# Patient Record
Sex: MALE | Race: OTHER | HISPANIC OR LATINO | URBAN - METROPOLITAN AREA
[De-identification: names, ages, dates, MRNs, and addresses within clinical notes are randomized per-mention and may not be internally consistent; named-entity substitution may affect disease eponyms.]

---

## 2023-08-13 ENCOUNTER — EMERGENCY (EMERGENCY)
Facility: HOSPITAL | Age: 43
LOS: 1 days | Discharge: HOME | End: 2023-08-13
Attending: EMERGENCY MEDICINE
Payer: OTHER GOVERNMENT

## 2023-08-13 VITALS
RESPIRATION RATE: 18 BRPM | HEART RATE: 91 BPM | TEMPERATURE: 98 F | SYSTOLIC BLOOD PRESSURE: 122 MMHG | DIASTOLIC BLOOD PRESSURE: 73 MMHG | WEIGHT: 218.92 LBS | OXYGEN SATURATION: 97 %

## 2023-08-13 VITALS
DIASTOLIC BLOOD PRESSURE: 63 MMHG | OXYGEN SATURATION: 99 % | HEART RATE: 71 BPM | SYSTOLIC BLOOD PRESSURE: 140 MMHG | RESPIRATION RATE: 16 BRPM

## 2023-08-13 PROCEDURE — 93010 ELECTROCARDIOGRAM REPORT: CPT

## 2023-08-13 PROCEDURE — 99283 EMERGENCY DEPT VISIT LOW MDM: CPT | Mod: 25

## 2023-08-13 PROCEDURE — 93005 ELECTROCARDIOGRAM TRACING: CPT

## 2023-08-13 PROCEDURE — 99284 EMERGENCY DEPT VISIT MOD MDM: CPT

## 2023-08-13 RX ORDER — ALPRAZOLAM 0.25 MG
0.5 TABLET ORAL ONCE
Refills: 0 | Status: DISCONTINUED | OUTPATIENT
Start: 2023-08-13 | End: 2023-08-13

## 2023-08-13 RX ADMIN — Medication 0.5 MILLIGRAM(S): at 21:21

## 2023-08-13 NOTE — ED PROVIDER NOTE - CLINICAL SUMMARY MEDICAL DECISION MAKING FREE TEXT BOX
Patient is a 41 yo male with PMHx anxiety on sertraline presenting after panic attack. Patient states 2 hours PTA he had an episode of anxiety, dull chest tightness, and SOB which lasted for 5 minutes. VSS, denies SI/HI/hallucinations.      EKG WNl, patient given xanax for anxiety attack, clinically sober, ambulating on his own, tolerating PO, well appearing, ready for DC with psych f/u.

## 2023-08-13 NOTE — ED PROVIDER NOTE - CARE PROVIDER_API CALL
Chencho Triana  Psychiatry  [address] [place] [id number]  Phone: [phone]  Fax: [phone]  Follow Up Time: 1-3 Days

## 2023-08-13 NOTE — ED ADULT NURSE NOTE - OBJECTIVE STATEMENT
pt presents to ed with anxiety.  pt reports going through a lot and cant seem to calm down.  NAD at this time.

## 2023-08-13 NOTE — ED PROVIDER NOTE - PATIENT PORTAL LINK FT
You can access the FollowMyHealth Patient Portal offered by [facility] by registering at the following website: [email or web address]. By joining [employer]’s FollowMyHealth portal, you will also be able to view your health information using other applications (apps) compatible with our system.

## 2023-08-13 NOTE — ED PROVIDER NOTE - OBJECTIVE STATEMENT
Patient is a 43 yo male with PMHx anxiety on sertraline presenting after panic attack. Patient states 2 hours PTA he had an episode of anxiety, dull chest tightness, and SOB which lasted for 5 minutes. Denies LOC, syncope,  blood thinners, diaphoresis, n/v. Denies any other sx. Denies any PMHx, allergies, smoking, IVDU, FH cardiac dx. Currently asymptomatic, anxious appearing however calm and cooperative. Denies fevers, chills, dizziness, lightheadedness, dysphagia, dysarthria, diplopia, photophobia, syncope, cough, congestion, abdominal pain, neck pain, back pain, diarrhea, dysuria, hematuria, hematochezia, hematemesis, n/v, recent travel, sick contacts, leg swelling.

## 2023-08-13 NOTE — ED PROVIDER NOTE - PHYSICAL EXAMINATION
Constitutional: Anxious appearing, awake, alert, oriented to person, place, and time/situation and in no apparent distress  ENMT: Airway patent nasal mucosa clear. Mouth with normal mucosa. Throat has no vesicles no oropharyngeal exudates and uvula is midline. No blood in the oropharynx  EYES: clear bilaterally, pupils equal, round and reactive to light  Cardiac: Regular rate, regular rhythm. Heart sounds S1, S2. No murmurs, rubs or gallops. Good capillary refill, 2+ pulses, no peripheral edema  Respiratory: Lungs CTAB, no use of accessory muscles, no crackles, satting 99% on RA in no distress  Gastrointestinal: Abdomen nondistended, non-tender, no rebound guarding or peritoneal signs  Genitourinary: No CVA tenderness, pelvis nontender, bladder nondistended  Musculoskeletal: Spine appears normal, range of motion is not limited, no muscle or joint tenderness  Neurological: Alert and oriented, no focal deficits, no motor or sensory deficits. CN 2-12 intact, PERRLA, EOMI, No FND

## 2023-08-13 NOTE — ED ADULT TRIAGE NOTE - CHIEF COMPLAINT QUOTE
pt states he is having an anxiety attack, going through a lot, in the , c/o feeling overwhelmed want something to calm me down  A&Ox3, resp wnl

## 2023-08-13 NOTE — ED PROVIDER NOTE - NSFOLLOWUPINSTRUCTIONS_ED_ALL_ED_FT
Panic Attack  A panic attack is a sudden episode of severe anxiety, fear, or discomfort that causes physical and emotional symptoms. A panic attack may be in response to something frightening, or it may occur for no known reason.    Symptoms of a panic attack can be similar to symptoms of a heart attack or stroke. It is important to see your health care provider when you have a panic attack so that these conditions can be ruled out.    What are the causes?  A panic attack may be caused by:  An extreme, life-threatening situation, such as a war or natural disaster.  An anxiety disorder, such as post-traumatic stress disorder.  Depression.  Panic disorder.  Certain medical conditions, including heart problems, neurological conditions, and infections.  Other causes may include:  Certain over-the-counter and prescription medicines.  Supplements that increase anxiety.  Illegal drugs that increase heart rate and blood pressure, such as methamphetamine.  What increases the risk?  You are more likely to develop this condition if:  You have another mental health condition.  You use alcohol, illegal drugs, or other substances.  You are under extreme stress.  A life event is causing increased feelings of anxiety and depression.  What are the signs or symptoms?  A panic attack starts suddenly, usually lasts 5–10 minutes, and occurs with one or more of the following:  A pounding heart, or a feeling that your heart is beating irregularly or faster than normal (palpitations).  Sweating, trembling, or shaking.  Shortness of breath, feeling smothered, or feeling choked.  Chest pain or discomfort.  Nausea or a strange feeling in your stomach.  Dizziness, feeling light-headed, or feeling like you might faint.  Other symptoms may include:  Chills or hot flashes.  Numbness or tingling in your lips, hands, or feet.  Feeling confused, or feeling that you are not yourself.  Fear of losing control or of being emotionally unstable, or fear of dying.  How is this diagnosed?  Doctor speaking with a patient in the doctor's office.  A panic attack is diagnosed with an assessment by your health care provider. During the assessment, your health care provider will ask questions about:  Your history of anxiety, depression, and panic attacks.  Your medical history.  Whether you drink alcohol, use drugs, take supplements, or take medicines. Be honest about your substance use.  Your health care provider may also:  Order blood tests or other kinds of tests to rule out serious medical conditions.  Refer you to a mental health professional for further evaluation.  How is this treated?  A panic attack is a symptom of another condition. Treatment depends on the cause of the panic attack.  If the cause is a medical problem, your health care provider will treat that problem or refer you to a specialist.  If the cause is emotional, you may be given anti-anxiety medicines or referred to a counselor. Anti-anxiety medicines may reduce how often attacks happen, reduce how severe the attacks are, and lower anxiety.  If the cause is a medicine, your health care provider may tell you to stop the medicine, change your dose, or take a different medicine.  If the cause is an illegal drug, treatment may involve letting the drug wear off and taking medicine to help the drug leave your body or to stop its effects. Attacks caused by heavy drug use may continue even if you stop using the drug.  Most panic attacks go away with treatment of the underlying problem. If you have panic attacks often, you may have a condition called panic disorder.    Follow these instructions at home:  Alcohol use    Do not drink alcohol if:  Your health care provider tells you not to drink.  You are pregnant, may be pregnant, or are planning to become pregnant.  If you drink alcohol:  Limit how much you have to:  0–1 drink a day for women.  0–2 drinks a day for men.  Know how much alcohol is in your drink. In the [place] one drink equals one 12 oz bottle of beer (355 mL), one 5 oz glass of wine (148 mL), or one 1½ oz glass of hard liquor (44 mL).  General instructions    Take over-the-counter and prescription medicines only as told by your health care provider.  If you feel anxious, limit your caffeine intake.  Take good care of your physical and mental health by:  Eating a balanced diet that includes plenty of fresh fruits and vegetables, whole grains, lean meats, and low-fat dairy.  Getting plenty of rest. Try to get 7–8 hours of uninterrupted sleep each night.  Exercising regularly. Try to get 30 minutes of physical activity at least 5 days a week.  Do not use any products that contain nicotine or tobacco. These products include cigarettes, chewing tobacco, and vaping devices, such as e-cigarettes. If you need help quitting, ask your health care provider.  Keep all follow-up visits. This is important. Panic attacks may have underlying physical or emotional problems that take time to accurately diagnose.  Where to find more information  Substance Abuse and Mental Health Services Administration (SAMHSA): samhsa.gov  National [place] of Mental Health ([facility]): www.nimh.nih.gov  Contact a health care provider if:  Your symptoms do not improve, or they get worse.  You are not able to take your medicine as prescribed because of side effects.  Get help right away if:  You have thoughts about hurting yourself or others.  Get help right away if you feel like you may hurt yourself or others, or have thoughts about taking your own life. Go to your nearest emergency room or:  Call 911.  Call the National Suicide Prevention Lifeline at 1-864.623.6401 or 386. This is open 24 hours a day.  Text the Crisis Text Line at 507884.  Summary  A panic attack is a sudden episode of severe anxiety, fear, or discomfort that causes physical and emotional symptoms.  Always see a health care provider to have the reasons for the panic attack correctly diagnosed.  If your panic attack was caused by a physical problem, follow your health care provider's suggestions for medicine, referral to a specialist, and lifestyle changes.  If your panic attack was caused by an emotional problem, follow through with counseling from a qualified mental health specialist.  If you feel like you may hurt yourself or others, call 911 and get help right away.  This information is not intended to replace advice given to you by your health care provider. Make sure you discuss any questions you have with your health care provider.    Document Revised: 07/28/2022 Document Reviewed: 07/28/2022

## 2023-08-16 ENCOUNTER — APPOINTMENT (OUTPATIENT)
Dept: PSYCHIATRY | Facility: CLINIC | Age: 43
End: 2023-08-16
Payer: OTHER GOVERNMENT

## 2023-08-16 DIAGNOSIS — Z81.8 FAMILY HISTORY OF OTHER MENTAL AND BEHAVIORAL DISORDERS: ICD-10-CM

## 2023-08-16 DIAGNOSIS — F41.9 ANXIETY DISORDER, UNSPECIFIED: ICD-10-CM

## 2023-08-16 DIAGNOSIS — F32.A ANXIETY DISORDER, UNSPECIFIED: ICD-10-CM

## 2023-08-16 PROBLEM — Z00.00 ENCOUNTER FOR PREVENTIVE HEALTH EXAMINATION: Status: ACTIVE | Noted: 2023-08-16

## 2023-08-16 PROCEDURE — 90792 PSYCH DIAG EVAL W/MED SRVCS: CPT

## 2023-08-16 RX ORDER — TRAZODONE HYDROCHLORIDE 50 MG/1
50 TABLET ORAL AT BEDTIME
Qty: 30 | Refills: 0 | Status: ACTIVE | COMMUNITY
Start: 2023-08-16 | End: 1900-01-01

## 2023-08-16 RX ORDER — SERTRALINE HYDROCHLORIDE 100 MG/1
100 TABLET, FILM COATED ORAL DAILY
Qty: 30 | Refills: 0 | Status: ACTIVE | COMMUNITY
Start: 2023-08-16 | End: 1900-01-01

## 2023-08-16 RX ORDER — SERTRALINE HYDROCHLORIDE 50 MG/1
50 TABLET, FILM COATED ORAL DAILY
Qty: 30 | Refills: 0 | Status: ACTIVE | COMMUNITY
Start: 2023-08-16 | End: 1900-01-01

## 2023-08-16 NOTE — PHYSICAL EXAM
[Feeling Restless] : feeling ~L restless [Cooperative] : cooperative [Depressed] : depressed [Anxious] : anxious [Constricted] : constricted [Clear] : clear [Linear/Goal Directed] : linear/goal directed [None] : none [None Reported] : none reported [Average] : average [WNL] : within normal limits
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
## 2023-08-16 NOTE — DISCUSSION/SUMMARY
[Low acute suicide risk] : Low acute suicide risk [Yes] : Safety Plan completed/updated (for individuals at risk): Yes [FreeTextEntry1] : Low acute risk: Patient has no suicidal thoughts, plan or intent.   He has insomnia.  He is engaged and willing to continue treatment.  Increase long-term risk considering anxiety, depression and family history of suicide, he is uncle committed suicide by hanging.  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  He reports his friend in [place] as well as his girlfriend in [place] his support and he talks to them.

## 2023-08-16 NOTE — REASON FOR VISIT
[Self-Referred] : Self-Referred [Not Applicable] : Not Applicable [Patient] : Patient [FreeTextEntry1] : Anxiety and depression

## 2023-08-16 NOTE — REVIEW OF SYSTEMS
[Negative] : Allergic/Immunologic [FreeTextEntry2] : low energy  [de-identified] : Depression anxiety

## 2023-08-16 NOTE — PLAN
[Admit to Program     (Add Program Admission information to a new column in the Admit/Discharge Flowsheet)] : Admit to program [Every ___ week(s)] : Medication Management: Every [unfilled] week(s) [FreeTextEntry4] : Increase Zoloft to 150 mg every morning as per patient request.  Add trazodone 50 mg at at bedtime for insomnia.  Side effects benefits and risk discussed and patient agrees with plan.  Referred patient to individual psychotherapy for anger management and family therapy.  He is 16 years old order.  Referral made to [facility] in [place].

## 2023-08-16 NOTE — SOCIAL HISTORY
[FreeTextEntry1] : Patient lives alone in the chronic venous and he is going to divorce with wife that resides in [facility] with their 2 children, 10 years old son and 17 years old daughter.  Patient does visit with his children about his relationship with 17 years ago that he is estranged.  He said that he hired a new  and helps that 37 month's long divorce (will end soon.  He is in service for 18 years and plans to retire in 2 years and moved to [place].

## 2023-08-16 NOTE — RISK ASSESSMENT
[Clinical Interview] : Clinical Interview [No] : No [Mood disorder] : mood disorder [Depressed mood/Anhedonia] : depressed mood/anhedonia [Severe anxiety, agitation or panic] : severe anxiety, agitation or panic [Family Hx of suicide/suicidal behavior] : family history of suicide/suicidal behavior [Change in provider or treatment (i.e., medications, psychotherapy, milieu)] : change in provider or treatment (i.e., medications, psychotherapy, milieu) [Triggering events leading to humiliation, shame, and/or despair] : triggering events leading to humiliation, shame, and/or despair (e.g. loss of relationship, financial or health status) (real or anticipated) [Inadequate social supports] : inadequate social supports [Identifies reasons for living] : identifies reasons for living [Supportive social network of family or friends] : supportive social network of family or friends [Responsibility to children, family, or others] : responsibility to children, family, or others [FreeTextEntry5] : Patient has 2 underage  children  and she is he is planning his future [FreeTextEntry6] : Patient did does not think that he will ever kill himself and does not think he is at any risk despite family history positive for suicide. 

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
## 2023-09-13 ENCOUNTER — APPOINTMENT (OUTPATIENT)
Dept: PSYCHIATRY | Facility: CLINIC | Age: 43
End: 2023-09-13